# Patient Record
Sex: MALE | Race: WHITE | NOT HISPANIC OR LATINO | Employment: STUDENT | URBAN - NONMETROPOLITAN AREA
[De-identification: names, ages, dates, MRNs, and addresses within clinical notes are randomized per-mention and may not be internally consistent; named-entity substitution may affect disease eponyms.]

---
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
## 2021-02-12 ENCOUNTER — OFFICE VISIT (OUTPATIENT)
Dept: FAMILY MEDICINE | Facility: OTHER | Age: 21
End: 2021-02-12
Attending: FAMILY MEDICINE
Payer: COMMERCIAL

## 2021-02-12 VITALS
DIASTOLIC BLOOD PRESSURE: 100 MMHG | OXYGEN SATURATION: 98 % | BODY MASS INDEX: 31.24 KG/M2 | TEMPERATURE: 98.9 F | WEIGHT: 223.13 LBS | HEART RATE: 116 BPM | SYSTOLIC BLOOD PRESSURE: 162 MMHG | HEIGHT: 71 IN | RESPIRATION RATE: 20 BRPM

## 2021-02-12 DIAGNOSIS — Z83.49 FAMILY HISTORY OF THYROID DISEASE: ICD-10-CM

## 2021-02-12 DIAGNOSIS — I10 HYPERTENSION, UNSPECIFIED TYPE: Primary | ICD-10-CM

## 2021-02-12 LAB
ALBUMIN UR-MCNC: NEGATIVE MG/DL
ANION GAP SERPL CALCULATED.3IONS-SCNC: 6 MMOL/L (ref 3–14)
APPEARANCE UR: CLEAR
BILIRUB UR QL STRIP: NEGATIVE
BUN SERPL-MCNC: 15 MG/DL (ref 7–25)
CALCIUM SERPL-MCNC: 10 MG/DL (ref 8.6–10.3)
CHLORIDE SERPL-SCNC: 103 MMOL/L (ref 98–107)
CHOLEST SERPL-MCNC: 135 MG/DL
CO2 SERPL-SCNC: 30 MMOL/L (ref 21–31)
COLOR UR AUTO: NORMAL
CREAT SERPL-MCNC: 1 MG/DL (ref 0.7–1.3)
ERYTHROCYTE [DISTWIDTH] IN BLOOD BY AUTOMATED COUNT: 12.2 % (ref 10–15)
GFR SERPL CREATININE-BSD FRML MDRD: >90 ML/MIN/{1.73_M2}
GLUCOSE SERPL-MCNC: 116 MG/DL (ref 70–105)
GLUCOSE UR STRIP-MCNC: NEGATIVE MG/DL
HCT VFR BLD AUTO: 49.2 % (ref 40–53)
HDLC SERPL-MCNC: 35 MG/DL (ref 23–92)
HGB BLD-MCNC: 16.9 G/DL (ref 13.3–17.7)
HGB UR QL STRIP: NEGATIVE
KETONES UR STRIP-MCNC: NEGATIVE MG/DL
LDLC SERPL CALC-MCNC: 80 MG/DL
LEUKOCYTE ESTERASE UR QL STRIP: NEGATIVE
MCH RBC QN AUTO: 28.8 PG (ref 26.5–33)
MCHC RBC AUTO-ENTMCNC: 34.3 G/DL (ref 31.5–36.5)
MCV RBC AUTO: 84 FL (ref 78–100)
NITRATE UR QL: NEGATIVE
NONHDLC SERPL-MCNC: 100 MG/DL
PH UR STRIP: 7 PH (ref 5–7)
PLATELET # BLD AUTO: 285 10E9/L (ref 150–450)
POTASSIUM SERPL-SCNC: 3.7 MMOL/L (ref 3.5–5.1)
RBC # BLD AUTO: 5.87 10E12/L (ref 4.4–5.9)
SODIUM SERPL-SCNC: 139 MMOL/L (ref 134–144)
SOURCE: NORMAL
SP GR UR STRIP: 1.02 (ref 1–1.03)
TRIGL SERPL-MCNC: 99 MG/DL
TSH SERPL DL<=0.05 MIU/L-ACNC: 1.84 IU/ML (ref 0.34–5.6)
UROBILINOGEN UR STRIP-MCNC: NORMAL MG/DL (ref 0–2)
WBC # BLD AUTO: 6 10E9/L (ref 4–11)

## 2021-02-12 PROCEDURE — 85027 COMPLETE CBC AUTOMATED: CPT | Mod: ZL | Performed by: FAMILY MEDICINE

## 2021-02-12 PROCEDURE — 36415 COLL VENOUS BLD VENIPUNCTURE: CPT | Mod: ZL | Performed by: FAMILY MEDICINE

## 2021-02-12 PROCEDURE — 80048 BASIC METABOLIC PNL TOTAL CA: CPT | Mod: ZL | Performed by: FAMILY MEDICINE

## 2021-02-12 PROCEDURE — 93000 ELECTROCARDIOGRAM COMPLETE: CPT | Performed by: INTERNAL MEDICINE

## 2021-02-12 PROCEDURE — 84443 ASSAY THYROID STIM HORMONE: CPT | Mod: ZL | Performed by: FAMILY MEDICINE

## 2021-02-12 PROCEDURE — 81003 URINALYSIS AUTO W/O SCOPE: CPT | Mod: ZL | Performed by: FAMILY MEDICINE

## 2021-02-12 PROCEDURE — 80061 LIPID PANEL: CPT | Mod: ZL | Performed by: FAMILY MEDICINE

## 2021-02-12 PROCEDURE — 99214 OFFICE O/P EST MOD 30 MIN: CPT | Performed by: FAMILY MEDICINE

## 2021-02-12 RX ORDER — METOPROLOL SUCCINATE 25 MG/1
25 TABLET, EXTENDED RELEASE ORAL DAILY
Qty: 30 TABLET | Refills: 1 | Status: SHIPPED | OUTPATIENT
Start: 2021-02-12 | End: 2021-02-24

## 2021-02-12 ASSESSMENT — PAIN SCALES - GENERAL: PAINLEVEL: NO PAIN (0)

## 2021-02-12 ASSESSMENT — MIFFLIN-ST. JEOR: SCORE: 2049.22

## 2021-02-12 NOTE — PATIENT INSTRUCTIONS
Checking labs for causes of hypertension   Start Toprol 25 mg daily to lower blood pressure and slow pulse  If you have no side effects and blood pressure is over 150/90 next week then take 50 mg (2 pills) until follow up appointment  Follow-up in 2 weeks

## 2021-02-12 NOTE — NURSING NOTE
"Patient presents to the clinic for elevated blood pressure readings over the past 5 months.  Today 190/120 with chest pressure and increase in shortness of breath.  Patient reports having a history of anxiety, prefers questionnaires sent to [facility].  Family history of cardiac problems.  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      Chief Complaint   Patient presents with     Hypertension       Initial BP (!) 162/100 (BP Location: Right arm, Patient Position: Sitting, Cuff Size: Adult Regular)   Pulse 116   Temp 98.9  F (37.2  C) (Temporal)   Resp 20   Ht 1.803 m (5' 11\")   Wt 101.2 kg (223 lb 2 oz)   SpO2 98%   BMI 31.12 kg/m   Estimated body mass index is 31.12 kg/m  as calculated from the following:    Height as of this encounter: 1.803 m (5' 11\").    Weight as of this encounter: 101.2 kg (223 lb 2 oz).  Medication Reconciliation: complete    Jocelyne Higuera LPN    "

## 2021-02-12 NOTE — PROGRESS NOTES
"Nursing Notes:   Jocelyne Higuera LPN  2/12/2021  4:08 PM  Signed  Patient presents to the clinic for elevated blood pressure readings over the past 5 months.  Today 190/120 with chest pressure and increase in shortness of breath.  Patient reports having a history of anxiety, prefers questionnaires sent to [facility].  Family history of cardiac problems.  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      Chief Complaint   Patient presents with     Hypertension       Initial BP (!) 162/100 (BP Location: Right arm, Patient Position: Sitting, Cuff Size: Adult Regular)   Pulse 116   Temp 98.9  F (37.2  C) (Temporal)   Resp 20   Ht 1.803 m (5' 11\")   Wt 101.2 kg (223 lb 2 oz)   SpO2 98%   BMI 31.12 kg/m   Estimated body mass index is 31.12 kg/m  as calculated from the following:    Height as of this encounter: 1.803 m (5' 11\").    Weight as of this encounter: 101.2 kg (223 lb 2 oz).  Medication Reconciliation: complete    Jocelyne Higuera LPN         Assessment & Plan       ICD-10-CM    1. Hypertension, unspecified type  I10 EKG 12-lead, tracing only     Basic Metabolic Panel     CBC W PLT No Diff     UA reflex to Microscopic     Lipid Panel     metoprolol succinate ER (TOPROL-XL) 25 MG 24 hr tablet     Lipid Panel     CBC W PLT No Diff     Basic Metabolic Panel     UA reflex to Microscopic   2. Family history of thyroid disease  Z83.49 TSH Reflex GH     TSH Reflex GH       Blood pressure elevated on more than one occasion, giving official hypertension diagnosis.  Today with concerning associated symptoms. Typically with current blood pressure would not have hypertensive urgency, but certainly with systolic of 190 earlier that could be seen  EKG obtained and shows no concerning ST changes. Unlikely to have cardiac ischemia at his age.  We discussed that certainly having high blood pressure could cause some of his symptoms such as headache, chest discomfort.  Anxiety could raise blood pressure, although " he feels that is not the case.  TSH obtained given family history and associated symptoms, but returns normal.  Lab work-up for hypertension obtained and is unrevealing.    We discussed options for treatment of hypertension.  Currently with anxiety symptoms and labs had not yet resulted, it would be best to pick medication that does not affect the labs.  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    Follow up in 2 weeks        Bandar Colon MD   [facility]      SUBJECTIVE:  20 year old male presents for elevated blood pressure.    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  Today his blood pressure was 190/120 with chest pressure and increase in shortness of breath, so nursing instructor recommended he come in for evaluation.  Constant headache recently.   Shortness of breath and chest heaviness present currently.  He reports some mood changes due to COVID and starting nursing program, but does not think anxiety has raised blood pressure  Eating more packaged foods now than in the past  Weight increased    Family History   Problem Relation Age of Onset     Other - See Comments Maternal Grandmother         pulmonary fibrosis     Other - See Comments Mother         graves disease     Hypertension Father      Heart Disease Maternal Grandfather 65     Hypertension Maternal Grandfather      Diabetes Maternal Grandfather      Diabetes Other         cousins     Hypertension Paternal Grandfather        REVIEW OF SYSTEMS:    Pertinent items are noted in HPI.    No current outpatient medications on file.     Allergies   Allergen Reactions     Amoxicillin-Pot Clavulanate Hives     Cefprozil Hives     Cefzil       OBJECTIVE:  BP (!) 162/100 (BP Location: Right arm,  "Patient Position: Sitting, Cuff Size: Adult Regular)   Pulse 116   Temp 98.9  F (37.2  C) (Temporal)   Resp 20   Ht 1.803 m (5' 11\")   Wt 101.2 kg (223 lb 2 oz)   SpO2 98%   BMI 31.12 kg/m      EXAM:  General Appearance: Alert. No acute distress  Chest/Respiratory Exam: Clear to auscultation bilaterally  Cardiovascular Exam: Regular rate and rhythm. S1, S2, no murmur, gallop, or rubs.  Extremities: 2+ pedal pulses.  No lower extremity edema.  Neurological: Slight, high-frequency tremor with arms extended, consistent more with anxiety  Psychiatric: Normal affect and mentation      Results for orders placed or performed in visit on 02/12/21   Lipid Panel     Status: None   Result Value Ref Range    Cholesterol 135 <200 mg/dL    Triglycerides 99 <150 mg/dL    HDL Cholesterol 35 23 - 92 mg/dL    LDL Cholesterol Calculated 80 <110 mg/dL    Non HDL Cholesterol 100 <120 mg/dL   TSH Reflex GH     Status: None   Result Value Ref Range    TSH Reflex 1.84 0.34 - 5.60 IU/mL   CBC W PLT No Diff     Status: None   Result Value Ref Range    WBC 6.0 4.0 - 11.0 10e9/L    RBC Count 5.87 4.4 - 5.9 10e12/L    Hemoglobin 16.9 13.3 - 17.7 g/dL    Hematocrit 49.2 40.0 - 53.0 %    MCV 84 78 - 100 fl    MCH 28.8 26.5 - 33.0 pg    MCHC 34.3 31.5 - 36.5 g/dL    RDW 12.2 10.0 - 15.0 %    Platelet Count 285 150 - 450 10e9/L   Basic Metabolic Panel     Status: Abnormal   Result Value Ref Range    Sodium 139 134 - 144 mmol/L    Potassium 3.7 3.5 - 5.1 mmol/L    Chloride 103 98 - 107 mmol/L    Carbon Dioxide 30 21 - 31 mmol/L    Anion Gap 6 3 - 14 mmol/L    Glucose 116 (H) 70 - 105 mg/dL    Urea Nitrogen 15 7 - 25 mg/dL    Creatinine 1.00 0.70 - 1.30 mg/dL    GFR Estimate >90 >60 mL/min/[1.73_m2]    GFR Estimate If Black >90 >60 mL/min/[1.73_m2]    Calcium 10.0 8.6 - 10.3 mg/dL   UA reflex to Microscopic     Status: None   Result Value Ref Range    Color Urine Light Yellow     Appearance Urine Clear     Glucose Urine Negative NEG^Negative " mg/dL    Bilirubin Urine Negative NEG^Negative    Ketones Urine Negative NEG^Negative mg/dL    Specific Gravity Urine 1.020 1.003 - 1.035    Blood Urine Negative NEG^Negative    pH Urine 7.0 5.0 - 7.0 pH    Protein Albumin Urine Negative NEG^Negative mg/dL    Urobilinogen mg/dL Normal 0.0 - 2.0 mg/dL    Nitrite Urine Negative NEG^Negative    Leukocyte Esterase Urine Negative NEG^Negative    Source Midstream Urine    EKG 12-lead, tracing only     Status: None   Result Value Ref Range    Interpretation ECG Click View Image link to view waveform and result           This document was prepared using a combination of typing and voice generated software.  While every attempt was made for accuracy, spelling and grammatical errors may exist.

## 2021-02-13 LAB — INTERPRETATION ECG - MUSE: NORMAL

## 2021-02-24 ENCOUNTER — OFFICE VISIT (OUTPATIENT)
Dept: FAMILY MEDICINE | Facility: OTHER | Age: 21
End: 2021-02-24
Attending: FAMILY MEDICINE
Payer: COMMERCIAL

## 2021-02-24 VITALS
WEIGHT: 224.13 LBS | TEMPERATURE: 97.5 F | DIASTOLIC BLOOD PRESSURE: 98 MMHG | OXYGEN SATURATION: 97 % | BODY MASS INDEX: 31.26 KG/M2 | HEART RATE: 96 BPM | SYSTOLIC BLOOD PRESSURE: 144 MMHG | RESPIRATION RATE: 16 BRPM

## 2021-02-24 DIAGNOSIS — I10 ESSENTIAL HYPERTENSION: ICD-10-CM

## 2021-02-24 PROCEDURE — 99213 OFFICE O/P EST LOW 20 MIN: CPT | Performed by: FAMILY MEDICINE

## 2021-02-24 RX ORDER — LISINOPRIL 10 MG/1
10 TABLET ORAL DAILY
Qty: 30 TABLET | Refills: 1 | Status: SHIPPED | OUTPATIENT
Start: 2021-02-24 | End: 2021-03-17

## 2021-02-24 RX ORDER — METOPROLOL SUCCINATE 25 MG/1
25 TABLET, EXTENDED RELEASE ORAL DAILY
Qty: 30 TABLET | Refills: 1 | Status: SHIPPED | OUTPATIENT
Start: 2021-02-24 | End: 2021-03-17

## 2021-02-24 ASSESSMENT — ANXIETY QUESTIONNAIRES
7. FEELING AFRAID AS IF SOMETHING AWFUL MIGHT HAPPEN: NOT AT ALL
3. WORRYING TOO MUCH ABOUT DIFFERENT THINGS: NOT AT ALL
GAD7 TOTAL SCORE: 2
IF YOU CHECKED OFF ANY PROBLEMS ON THIS QUESTIONNAIRE, HOW DIFFICULT HAVE THESE PROBLEMS MADE IT FOR YOU TO DO YOUR WORK, TAKE CARE OF THINGS AT HOME, OR GET ALONG WITH OTHER PEOPLE: NOT DIFFICULT AT ALL
6. BECOMING EASILY ANNOYED OR IRRITABLE: NOT AT ALL
1. FEELING NERVOUS, ANXIOUS, OR ON EDGE: SEVERAL DAYS
2. NOT BEING ABLE TO STOP OR CONTROL WORRYING: NOT AT ALL
5. BEING SO RESTLESS THAT IT IS HARD TO SIT STILL: NOT AT ALL

## 2021-02-24 ASSESSMENT — PATIENT HEALTH QUESTIONNAIRE - PHQ9
5. POOR APPETITE OR OVEREATING: SEVERAL DAYS
SUM OF ALL RESPONSES TO PHQ QUESTIONS 1-9: 6

## 2021-02-24 ASSESSMENT — PAIN SCALES - GENERAL: PAINLEVEL: NO PAIN (0)

## 2021-02-24 NOTE — NURSING NOTE
"Patient presents to the clinic for follow up with hypertension.      Chief Complaint   Patient presents with     Clinic Care Coordination - Follow-up       Initial BP (!) 156/100 (BP Location: Right arm, Patient Position: Sitting, Cuff Size: Adult Regular)   Pulse 96   Temp 97.5  F (36.4  C) (Temporal)   Resp 16   Wt 101.7 kg (224 lb 2 oz)   SpO2 97%   BMI 31.26 kg/m   Estimated body mass index is 31.26 kg/m  as calculated from the following:    Height as of 2/12/21: 1.803 m (5' 11\").    Weight as of this encounter: 101.7 kg (224 lb 2 oz).  Medication Reconciliation: complete    Jocelyne Higuera LPN    "

## 2021-02-24 NOTE — PROGRESS NOTES
"Nursing Notes:   Jocelyne Higuera LPN  2/24/2021  4:28 PM  Signed  Patient presents to the clinic for follow up with hypertension.      Chief Complaint   Patient presents with     Clinic Care Coordination - Follow-up       Initial BP (!) 156/100 (BP Location: Right arm, Patient Position: Sitting, Cuff Size: Adult Regular)   Pulse 96   Temp 97.5  F (36.4  C) (Temporal)   Resp 16   Wt 101.7 kg (224 lb 2 oz)   SpO2 97%   BMI 31.26 kg/m   Estimated body mass index is 31.26 kg/m  as calculated from the following:    Height as of 2/12/21: 1.803 m (5' 11\").    Weight as of this encounter: 101.7 kg (224 lb 2 oz).  Medication Reconciliation: complete    Jocelyne Higuera LPN         Assessment & Plan       ICD-10-CM    1. Essential hypertension  I10 metoprolol succinate ER (TOPROL-XL) 25 MG 24 hr tablet     lisinopril (ZESTRIL) 10 MG tablet     Blood pressure improved, but remains above goal.  Labs are all fine, so seems more like genetic essential hypertension rather than secondary hypertension.  Metoprolol caused slight fatigue, which is not unexpected.  Is not an ideal solitary long-term treatment for blood pressure.  Was started for potential hyperthyroidism based on symptoms from last appointment, but TSH is normal.    Recommend starting lisinopril 10 mg daily.  Depending on results, could transition off metoprolol and onto the higher lisinopril dose.      Follow up 3 weeks    Bandar Colon MD     [facility]      SUBJECTIVE:  20 year old male presents for follow up on hypertension  Metoprolol started 2 weeks ago at 25 mg daily  Felt a little tired on it at first, but better now  Readings were better under 150/90, compared to over 160/100 previously      REVIEW OF SYSTEMS:    Pertinent items are noted in HPI.    Current Outpatient Medications   Medication Sig Dispense Refill     metoprolol succinate ER (TOPROL-XL) 25 MG 24 hr tablet Take 1 tablet (25 mg) by mouth daily 30 tablet 1 "     Allergies   Allergen Reactions     Amoxicillin-Pot Clavulanate Hives     Cefprozil Hives     Cefzil       OBJECTIVE:  BP (!) 156/100 (BP Location: Right arm, Patient Position: Sitting, Cuff Size: Adult Regular)   Pulse 96   Temp 97.5  F (36.4  C) (Temporal)   Resp 16   Wt 101.7 kg (224 lb 2 oz)   SpO2 97%   BMI 31.26 kg/m      EXAM:  General Appearance: Alert. No acute distress  Psychiatric: Normal affect and mentation        This document was prepared using a combination of typing and voice generated software.  While every attempt was made for accuracy, spelling and grammatical errors may exist.

## 2021-02-24 NOTE — PATIENT INSTRUCTIONS
Start lisinopril 10 mg daily  Continue metoprolol for now  If readings are often below 120/70 or you feel lightheaded, then stop the metoprolol  Follow-up in 3 weeks

## 2021-02-25 ASSESSMENT — ANXIETY QUESTIONNAIRES: GAD7 TOTAL SCORE: 2

## 2021-03-17 ENCOUNTER — OFFICE VISIT (OUTPATIENT)
Dept: FAMILY MEDICINE | Facility: OTHER | Age: 21
End: 2021-03-17
Attending: FAMILY MEDICINE
Payer: COMMERCIAL

## 2021-03-17 VITALS
TEMPERATURE: 97.1 F | OXYGEN SATURATION: 98 % | BODY MASS INDEX: 31.5 KG/M2 | HEIGHT: 71 IN | RESPIRATION RATE: 20 BRPM | DIASTOLIC BLOOD PRESSURE: 80 MMHG | WEIGHT: 225 LBS | HEART RATE: 98 BPM | SYSTOLIC BLOOD PRESSURE: 122 MMHG

## 2021-03-17 DIAGNOSIS — I10 ESSENTIAL HYPERTENSION: ICD-10-CM

## 2021-03-17 PROCEDURE — 99213 OFFICE O/P EST LOW 20 MIN: CPT | Performed by: FAMILY MEDICINE

## 2021-03-17 RX ORDER — LISINOPRIL 10 MG/1
10 TABLET ORAL DAILY
Qty: 90 TABLET | Refills: 4 | Status: SHIPPED | OUTPATIENT
Start: 2021-03-17 | End: 2022-02-28 | Stop reason: SINTOL

## 2021-03-17 ASSESSMENT — ANXIETY QUESTIONNAIRES
6. BECOMING EASILY ANNOYED OR IRRITABLE: SEVERAL DAYS
3. WORRYING TOO MUCH ABOUT DIFFERENT THINGS: SEVERAL DAYS
GAD7 TOTAL SCORE: 3
1. FEELING NERVOUS, ANXIOUS, OR ON EDGE: SEVERAL DAYS
5. BEING SO RESTLESS THAT IT IS HARD TO SIT STILL: NOT AT ALL
7. FEELING AFRAID AS IF SOMETHING AWFUL MIGHT HAPPEN: NOT AT ALL
IF YOU CHECKED OFF ANY PROBLEMS ON THIS QUESTIONNAIRE, HOW DIFFICULT HAVE THESE PROBLEMS MADE IT FOR YOU TO DO YOUR WORK, TAKE CARE OF THINGS AT HOME, OR GET ALONG WITH OTHER PEOPLE: NOT DIFFICULT AT ALL
2. NOT BEING ABLE TO STOP OR CONTROL WORRYING: NOT AT ALL

## 2021-03-17 ASSESSMENT — PATIENT HEALTH QUESTIONNAIRE - PHQ9
5. POOR APPETITE OR OVEREATING: NOT AT ALL
SUM OF ALL RESPONSES TO PHQ QUESTIONS 1-9: 4

## 2021-03-17 ASSESSMENT — MIFFLIN-ST. JEOR: SCORE: 2057.72

## 2021-03-17 ASSESSMENT — PAIN SCALES - GENERAL: PAINLEVEL: NO PAIN (0)

## 2021-03-17 NOTE — PROGRESS NOTES
"Nursing Notes:   Ju Santiago LPN  3/17/2021  4:37 PM  Signed  Chief Complaint   Patient presents with     RECHECK     blood pressure   Patient is here for a blood pressure follow up .   Initial /80 (BP Location: Right arm, Patient Position: Sitting, Cuff Size: Adult Large)   Pulse 98   Temp 97.1  F (36.2  C) (Temporal)   Resp 20   Ht 1.803 m (5' 11\")   Wt 102.1 kg (225 lb)   SpO2 98%   BMI 31.38 kg/m   Estimated body mass index is 31.38 kg/m  as calculated from the following:    Height as of this encounter: 1.803 m (5' 11\").    Weight as of this encounter: 102.1 kg (225 lb).  Medication Reconciliation: complete    Ju Santiago LPN       Assessment & Plan       ICD-10-CM    1. Essential hypertension  I10 lisinopril (ZESTRIL) 10 MG tablet     Blood pressure very well controlled on 10 mg lisinopril. Better than expected response to this dose  He can try reducing to 5 mg and tracking readings.   If 5 mg effective call for lower dose on future refill  Planned lab today expecting need for more lisinopril. Elected against lab given low dose lisinopril required.      BMI:   Estimated body mass index is 31.38 kg/m  as calculated from the following:    Height as of this encounter: 1.803 m (5' 11\").    Weight as of this encounter: 102.1 kg (225 lb).   Weight management plan: Discussed healthy diet and exercise guidelines      Follow up 1 year    Bandar Colon MD     [facility]      SUBJECTIVE:  20 year old male presents for follow up on hypertension     Changed from metoprolol to lisinopril 10 mg daily  Helping without side effects  Lowest is 113/68, highest 130/80. Usually below 120/80  Completely off metoprolol    REVIEW OF SYSTEMS:    Constitutional: negative  Respiratory: negative  Cardiovascular: negative    Current Outpatient Medications   Medication Sig Dispense Refill     lisinopril (ZESTRIL) 10 MG tablet Take 1 tablet (10 mg) by mouth daily 30 tablet 1     metoprolol " "succinate ER (TOPROL-XL) 25 MG 24 hr tablet Take 1 tablet (25 mg) by mouth daily 30 tablet 1     Allergies   Allergen Reactions     Amoxicillin-Pot Clavulanate Hives     Cefprozil Hives     Cefzil       OBJECTIVE:  /80 (BP Location: Right arm, Patient Position: Sitting, Cuff Size: Adult Large)   Pulse 98   Temp 97.1  F (36.2  C) (Temporal)   Resp 20   Ht 1.803 m (5' 11\")   Wt 102.1 kg (225 lb)   SpO2 98%   BMI 31.38 kg/m      EXAM:  General Appearance: Alert. No acute distress  Psychiatric: Normal affect and mentation        This document was prepared using a combination of typing and voice generated software.  While every attempt was made for accuracy, spelling and grammatical errors may exist.   "

## 2021-03-17 NOTE — NURSING NOTE
"Chief Complaint   Patient presents with     RECHECK     blood pressure   Patient is here for a blood pressure follow up .   Initial /80 (BP Location: Right arm, Patient Position: Sitting, Cuff Size: Adult Large)   Pulse 98   Temp 97.1  F (36.2  C) (Temporal)   Resp 20   Ht 1.803 m (5' 11\")   Wt 102.1 kg (225 lb)   SpO2 98%   BMI 31.38 kg/m   Estimated body mass index is 31.38 kg/m  as calculated from the following:    Height as of this encounter: 1.803 m (5' 11\").    Weight as of this encounter: 102.1 kg (225 lb).  Medication Reconciliation: complete    Ju Santiago LPN  "

## 2021-03-18 ASSESSMENT — ANXIETY QUESTIONNAIRES: GAD7 TOTAL SCORE: 3

## 2021-09-15 ENCOUNTER — ALLIED HEALTH/NURSE VISIT (OUTPATIENT)
Dept: FAMILY MEDICINE | Facility: OTHER | Age: 21
End: 2021-09-15
Attending: FAMILY MEDICINE
Payer: COMMERCIAL

## 2021-09-15 DIAGNOSIS — R51.9 NONINTRACTABLE EPISODIC HEADACHE, UNSPECIFIED HEADACHE TYPE: ICD-10-CM

## 2021-09-15 DIAGNOSIS — Z20.822 EXPOSURE TO 2019 NOVEL CORONAVIRUS: Primary | ICD-10-CM

## 2021-09-15 PROCEDURE — C9803 HOPD COVID-19 SPEC COLLECT: HCPCS

## 2021-09-15 PROCEDURE — U0005 INFEC AGEN DETEC AMPLI PROBE: HCPCS | Mod: ZL

## 2021-09-15 NOTE — NURSING NOTE
Chief Complaint   Patient presents with     Covid 19 Testing     Patient swabbed for COVID-19 testing for headache    Nhung Butler MA on 9/15/2021 at 10:35 AM

## 2021-09-16 LAB — SARS-COV-2 RNA RESP QL NAA+PROBE: NEGATIVE

## 2021-10-03 ENCOUNTER — HEALTH MAINTENANCE LETTER (OUTPATIENT)
Age: 21
End: 2021-10-03

## 2022-01-22 ENCOUNTER — HEALTH MAINTENANCE LETTER (OUTPATIENT)
Age: 22
End: 2022-01-22

## 2022-02-28 ENCOUNTER — OFFICE VISIT (OUTPATIENT)
Dept: FAMILY MEDICINE | Facility: OTHER | Age: 22
End: 2022-02-28
Attending: FAMILY MEDICINE
Payer: COMMERCIAL

## 2022-02-28 VITALS
OXYGEN SATURATION: 98 % | WEIGHT: 225 LBS | BODY MASS INDEX: 31.38 KG/M2 | HEART RATE: 94 BPM | DIASTOLIC BLOOD PRESSURE: 98 MMHG | TEMPERATURE: 98.6 F | SYSTOLIC BLOOD PRESSURE: 160 MMHG | RESPIRATION RATE: 16 BRPM

## 2022-02-28 DIAGNOSIS — I10 ESSENTIAL HYPERTENSION: ICD-10-CM

## 2022-02-28 DIAGNOSIS — F41.1 GENERALIZED ANXIETY DISORDER: ICD-10-CM

## 2022-02-28 DIAGNOSIS — Z00.00 PREVENTATIVE HEALTH CARE: Primary | ICD-10-CM

## 2022-02-28 DIAGNOSIS — R01.1 SYSTOLIC MURMUR: ICD-10-CM

## 2022-02-28 DIAGNOSIS — E04.9 THYROID ENLARGEMENT: ICD-10-CM

## 2022-02-28 LAB
ANION GAP SERPL CALCULATED.3IONS-SCNC: 7 MMOL/L (ref 3–14)
BUN SERPL-MCNC: 15 MG/DL (ref 7–25)
CALCIUM SERPL-MCNC: 10.1 MG/DL (ref 8.6–10.3)
CHLORIDE BLD-SCNC: 103 MMOL/L (ref 98–107)
CO2 SERPL-SCNC: 27 MMOL/L (ref 21–31)
CREAT SERPL-MCNC: 0.99 MG/DL (ref 0.7–1.3)
GFR SERPL CREATININE-BSD FRML MDRD: >90 ML/MIN/1.73M2
GLUCOSE BLD-MCNC: 97 MG/DL (ref 70–105)
POTASSIUM BLD-SCNC: 4.1 MMOL/L (ref 3.5–5.1)
SODIUM SERPL-SCNC: 137 MMOL/L (ref 134–144)
TSH SERPL DL<=0.005 MIU/L-ACNC: 0.65 MU/L (ref 0.4–4)

## 2022-02-28 PROCEDURE — 99213 OFFICE O/P EST LOW 20 MIN: CPT | Mod: 25 | Performed by: FAMILY MEDICINE

## 2022-02-28 PROCEDURE — 99395 PREV VISIT EST AGE 18-39: CPT | Performed by: FAMILY MEDICINE

## 2022-02-28 PROCEDURE — 82310 ASSAY OF CALCIUM: CPT | Mod: ZL | Performed by: FAMILY MEDICINE

## 2022-02-28 PROCEDURE — 36415 COLL VENOUS BLD VENIPUNCTURE: CPT | Mod: ZL | Performed by: FAMILY MEDICINE

## 2022-02-28 PROCEDURE — 84443 ASSAY THYROID STIM HORMONE: CPT | Mod: ZL | Performed by: FAMILY MEDICINE

## 2022-02-28 RX ORDER — AMLODIPINE BESYLATE 5 MG/1
5 TABLET ORAL DAILY
Qty: 30 TABLET | Refills: 1 | Status: SHIPPED | OUTPATIENT
Start: 2022-02-28 | End: 2022-03-29

## 2022-02-28 ASSESSMENT — ANXIETY QUESTIONNAIRES
5. BEING SO RESTLESS THAT IT IS HARD TO SIT STILL: SEVERAL DAYS
4. TROUBLE RELAXING: MORE THAN HALF THE DAYS
7. FEELING AFRAID AS IF SOMETHING AWFUL MIGHT HAPPEN: NOT AT ALL
GAD7 TOTAL SCORE: 10
7. FEELING AFRAID AS IF SOMETHING AWFUL MIGHT HAPPEN: NOT AT ALL
2. NOT BEING ABLE TO STOP OR CONTROL WORRYING: SEVERAL DAYS
6. BECOMING EASILY ANNOYED OR IRRITABLE: NEARLY EVERY DAY
1. FEELING NERVOUS, ANXIOUS, OR ON EDGE: MORE THAN HALF THE DAYS
GAD7 TOTAL SCORE: 10
3. WORRYING TOO MUCH ABOUT DIFFERENT THINGS: SEVERAL DAYS

## 2022-02-28 ASSESSMENT — ENCOUNTER SYMPTOMS: NERVOUS/ANXIOUS: 1

## 2022-02-28 ASSESSMENT — PAIN SCALES - GENERAL: PAINLEVEL: NO PAIN (0)

## 2022-02-28 NOTE — PATIENT INSTRUCTIONS
Stop lisinopril  Start amlodipine 5 mg daily    Start fluoxetine 20 mg daily to help anxiety    Checking thyroid and kidney tests  Thyroid ultrasound     Echocardiogram of your heart due to very quiet murmur

## 2022-02-28 NOTE — NURSING NOTE
"Chief Complaint   Patient presents with     Physical     Anxiety     Hypertension     Patient presents to clinic for physical. Has concurs about blood pressure, and anxiety.      Initial BP (!) 152/102 (BP Location: Right arm, Patient Position: Sitting, Cuff Size: Adult Regular)   Pulse 94   Temp 98.6  F (37  C) (Tympanic)   Resp 16   Wt 102.1 kg (225 lb)   SpO2 98%   BMI 31.38 kg/m   Estimated body mass index is 31.38 kg/m  as calculated from the following:    Height as of 3/17/21: 1.803 m (5' 11\").    Weight as of this encounter: 102.1 kg (225 lb).  Medication Reconciliation: complete  Libra Giang RN  FOOD SECURITY SCREENING QUESTIONS  The next two questions are to help us understand your food security.  If you are feeling you need any assistance in this area, we have resources available to support you today.    Hunger Vital Signs:  Within the past 12 months we worried whether our food would run out before we got money to buy more. Never  Within the past 12 months the food we bought just didn't last and we didn't have money to get more. Never      Libra Giang RN 2/28/2022 11:30 AM  "

## 2022-02-28 NOTE — PROGRESS NOTES
"  Assessment & Plan       ICD-10-CM    1. Preventative health care  Z00.00    2. Systolic murmur  R01.1 Echocardiogram Complete   3. Thyroid enlargement  E04.9 TSH Reflex GH     US Thyroid     TSH Reflex GH   4. Essential hypertension  I10 amLODIPine (NORVASC) 5 MG tablet     Basic Metabolic Panel     Basic Metabolic Panel   5. Generalized anxiety disorder  F41.1 FLUoxetine (PROZAC) 20 MG capsule       Blood pressure was controlled on lisinopril, but is now uncontrolled.  He attributes this to anxiety.  Additionally, having some side effects from lisinopril with chronic slight cough.    Reviewed other options for hypertension treatment.  Start amlodipine 5 mg daily.  He will monitor blood pressure at home.    Interested in treatment for generalized anxiety.  Reviewed some medication options.  Start fluoxetine 20 mg daily.  Most side effects should improve in the first several days.  If not, let me know.    On exam, has a slight systolic murmur.  Recommend echocardiogram for further assessment.    Thyroid is enlarged.  Mother has a history of Graves'.  Certainly this could contribute to hypertension and anxiety.  TSH normal.  Ordered thyroid ultrasound for further assessment.    BMI:   Estimated body mass index is 31.38 kg/m  as calculated from the following:    Height as of 3/17/21: 1.803 m (5' 11\").    Weight as of this encounter: 102.1 kg (225 lb).   Weight management plan: Discussed healthy diet and exercise guidelines    Vaccinations are current.  Declines HIV and hep C testing      Return today (on 2/28/2022).    Bandar Colon MD  [facility] is a 21 year old who presents for the following health issues physical, anxiety and blood pressure.       Healthy Habits:     Getting at least 3 servings of Calcium per day:  NO    Bi-annual eye exam:  Yes    Dental care twice a year:  Yes    Sleep apnea or symptoms of sleep apnea:  Daytime drowsiness    Diet:  Regular (no " restrictions)    Frequency of exercise:  2-3 days/week    Duration of exercise:  15-30 minutes    Taking medications regularly:  Yes    Medication side effects:  None    PHQ-2 Total Score: 0    Additional concerns today:  Yes  Anxiety       Blood pressure was controlled on lisinopril  More stress since August  Blood pressure higher since  He believes anxiety is the cause  Has not taken antianxiety medicine in the past.    Review of Systems   Constitutional: Negative.    HENT: Negative.    Eyes: Negative.    Respiratory: Negative.    Cardiovascular: Negative.    Gastrointestinal: Negative.    Genitourinary: Negative.    Musculoskeletal: Negative.    Neurological: Negative.    Psychiatric/Behavioral: The patient is nervous/anxious.             Objective    BP (!) 152/102 (BP Location: Right arm, Patient Position: Sitting, Cuff Size: Adult Regular)   Pulse 94   Temp 98.6  F (37  C) (Tympanic)   Resp 16   Wt 102.1 kg (225 lb)   SpO2 98%   BMI 31.38 kg/m    Body mass index is 31.38 kg/m .  Physical Exam   General Appearance: Pleasant, alert, appropriate appearance for age. No acute distress  Eye Exam:  Normal external eye, conjunctiva, lids, cornea. INDIO.  Ear Exam: Normal TM's bilaterally. Normal auditory canals and external ears.  OroPharynx Exam:  Dental hygiene adequate. Normal buccal mucosa. Normal pharynx.  Neck Exam:  Supple, no masses or nodes. No carotid bruits.  Thyroid Exam: Right thyroid is enlarged  Chest/Respiratory Exam: Normal chest wall and respirations. Clear to auscultation.  Cardiovascular Exam: Regular rate and rhythm. 2/6 LAURA and split S2  Gastrointestinal Exam: Soft, non-tender, no masses or organomegaly.  Musculoskeletal Exam: Back is straight and non-tender, full ROM of upper and lower extremities.  Foot Exam: Left and right foot: good pedal pulses.  Skin: no rash or abnormalities  Neurologic Exam: Nonfocal, symmetric DTRs, normal gross motor, tone coordination and no tremor.  Psychiatric  Exam: Alert and oriented - appropriate affect.      Results for orders placed or performed in visit on 02/28/22   Basic Metabolic Panel     Status: Normal   Result Value Ref Range    Sodium 137 134 - 144 mmol/L    Potassium 4.1 3.5 - 5.1 mmol/L    Chloride 103 98 - 107 mmol/L    Carbon Dioxide (CO2) 27 21 - 31 mmol/L    Anion Gap 7 3 - 14 mmol/L    Urea Nitrogen 15 7 - 25 mg/dL    Creatinine 0.99 0.70 - 1.30 mg/dL    Calcium 10.1 8.6 - 10.3 mg/dL    Glucose 97 70 - 105 mg/dL    GFR Estimate >90 >60 mL/min/1.73m2   TSH Reflex GH     Status: Normal   Result Value Ref Range    TSH 0.65 0.40 - 4.00 mU/L                Answers for HPI/ROS submitted by the patient on 2/28/2022  BRIANA 7 TOTAL SCORE: 10

## 2022-03-01 ASSESSMENT — ANXIETY QUESTIONNAIRES: GAD7 TOTAL SCORE: 10

## 2022-03-01 ASSESSMENT — ENCOUNTER SYMPTOMS
GASTROINTESTINAL NEGATIVE: 1
MUSCULOSKELETAL NEGATIVE: 1
CONSTITUTIONAL NEGATIVE: 1
RESPIRATORY NEGATIVE: 1
EYES NEGATIVE: 1
NEUROLOGICAL NEGATIVE: 1
CARDIOVASCULAR NEGATIVE: 1

## 2022-03-11 ENCOUNTER — HOSPITAL ENCOUNTER (OUTPATIENT)
Dept: ULTRASOUND IMAGING | Facility: OTHER | Age: 22
Discharge: HOME | End: 2022-03-11
Attending: FAMILY MEDICINE
Payer: COMMERCIAL

## 2022-03-11 ENCOUNTER — HOSPITAL ENCOUNTER (OUTPATIENT)
Dept: CARDIOLOGY | Facility: OTHER | Age: 22
Discharge: HOME | End: 2022-03-11
Attending: FAMILY MEDICINE
Payer: COMMERCIAL

## 2022-03-11 DIAGNOSIS — R01.1 SYSTOLIC MURMUR: ICD-10-CM

## 2022-03-11 DIAGNOSIS — E04.9 THYROID ENLARGEMENT: ICD-10-CM

## 2022-03-11 LAB — LVEF ECHO: NORMAL

## 2022-03-11 PROCEDURE — 93306 TTE W/DOPPLER COMPLETE: CPT

## 2022-03-11 PROCEDURE — 93306 TTE W/DOPPLER COMPLETE: CPT | Mod: 26 | Performed by: INTERNAL MEDICINE

## 2022-03-11 PROCEDURE — 76536 US EXAM OF HEAD AND NECK: CPT

## 2022-03-29 ENCOUNTER — OFFICE VISIT (OUTPATIENT)
Dept: FAMILY MEDICINE | Facility: OTHER | Age: 22
End: 2022-03-29
Attending: FAMILY MEDICINE
Payer: COMMERCIAL

## 2022-03-29 VITALS
TEMPERATURE: 98.5 F | RESPIRATION RATE: 20 BRPM | OXYGEN SATURATION: 97 % | DIASTOLIC BLOOD PRESSURE: 70 MMHG | WEIGHT: 227 LBS | SYSTOLIC BLOOD PRESSURE: 138 MMHG | HEART RATE: 104 BPM | BODY MASS INDEX: 31.66 KG/M2

## 2022-03-29 DIAGNOSIS — F41.1 GENERALIZED ANXIETY DISORDER: ICD-10-CM

## 2022-03-29 DIAGNOSIS — I10 ESSENTIAL HYPERTENSION: ICD-10-CM

## 2022-03-29 PROCEDURE — 99213 OFFICE O/P EST LOW 20 MIN: CPT | Performed by: FAMILY MEDICINE

## 2022-03-29 RX ORDER — CITALOPRAM HYDROBROMIDE 20 MG/1
20 TABLET ORAL DAILY
Qty: 30 TABLET | Refills: 1 | Status: SHIPPED | OUTPATIENT
Start: 2022-03-29 | End: 2022-05-27

## 2022-03-29 RX ORDER — AMLODIPINE BESYLATE 5 MG/1
5 TABLET ORAL DAILY
Qty: 90 TABLET | Refills: 4 | Status: SHIPPED | OUTPATIENT
Start: 2022-03-29

## 2022-03-29 ASSESSMENT — ANXIETY QUESTIONNAIRES
6. BECOMING EASILY ANNOYED OR IRRITABLE: NEARLY EVERY DAY
2. NOT BEING ABLE TO STOP OR CONTROL WORRYING: NEARLY EVERY DAY
3. WORRYING TOO MUCH ABOUT DIFFERENT THINGS: SEVERAL DAYS
5. BEING SO RESTLESS THAT IT IS HARD TO SIT STILL: NOT AT ALL
7. FEELING AFRAID AS IF SOMETHING AWFUL MIGHT HAPPEN: NOT AT ALL
IF YOU CHECKED OFF ANY PROBLEMS ON THIS QUESTIONNAIRE, HOW DIFFICULT HAVE THESE PROBLEMS MADE IT FOR YOU TO DO YOUR WORK, TAKE CARE OF THINGS AT HOME, OR GET ALONG WITH OTHER PEOPLE: SOMEWHAT DIFFICULT
GAD7 TOTAL SCORE: 11
1. FEELING NERVOUS, ANXIOUS, OR ON EDGE: MORE THAN HALF THE DAYS

## 2022-03-29 ASSESSMENT — PATIENT HEALTH QUESTIONNAIRE - PHQ9
5. POOR APPETITE OR OVEREATING: MORE THAN HALF THE DAYS
SUM OF ALL RESPONSES TO PHQ QUESTIONS 1-9: 11

## 2022-03-29 ASSESSMENT — PAIN SCALES - GENERAL: PAINLEVEL: NO PAIN (0)

## 2022-03-29 NOTE — NURSING NOTE
"Patient presents to the clinic for follow up.    FOOD SECURITY SCREENING QUESTIONS:    The next two questions are to help us understand your food security.  If you are feeling you need any assistance in this area, we have resources available to support you today.    Hunger Vital Signs:  Within the past 12 months we worried whether our food would run out before we got money to buy more. Never  Within the past 12 months the food we bought just didn't last and we didn't have money to get more. Never    Advance Care Directive on file? no  Advance Care Directive provided to patient? Declined.    Chief Complaint   Patient presents with     Clinic Care Coordination - Follow-up       Initial /70 (BP Location: Right arm, Patient Position: Sitting, Cuff Size: Adult Large)   Pulse 104   Temp 98.5  F (36.9  C) (Temporal)   Resp 20   Wt 103 kg (227 lb)   SpO2 97%   BMI 31.66 kg/m   Estimated body mass index is 31.66 kg/m  as calculated from the following:    Height as of 3/17/21: 1.803 m (5' 11\").    Weight as of this encounter: 103 kg (227 lb).  Medication Reconciliation: complete        Jocelyne Higuera LPN       "

## 2022-03-29 NOTE — PROGRESS NOTES
Assessment & Plan       ICD-10-CM    1. Generalized anxiety disorder  F41.1 citalopram (CELEXA) 20 MG tablet   2. Essential hypertension  I10 amLODIPine (NORVASC) 5 MG tablet     Fluoxetine is causing daytime sedation.  His mother takes citalopram.  Recommend trying citalopram 20 mg daily.  Let me know if side effects.  If not tolerating citalopram, would consider sertraline 50 mg daily instead.    Blood pressure controlled on current dose of amlodipine.  Refilled for 90 days, 1 year supply.     Depression Screening Follow Up    PHQ 3/29/2022   PHQ-9 Total Score 11   Q9: Thoughts of better off dead/self-harm past 2 weeks Not at all       Follow Up Actions Taken  Changed medication, follow up in a month     Return in about 1 month (around 4/29/2022).    Bandar Colon MD  [facility] is a 21 year old who presents for the following health issues     History of Present Illness       Reason for visit:  Medication Follow up/ BP check  Symptom onset:  More than a month    He eats 0-1 servings of fruits and vegetables daily.He consumes 1 sweetened beverage(s) daily.He exercises with enough effort to increase his heart rate 20 to 29 minutes per day.  He exercises with enough effort to increase his heart rate 3 or less days per week.   He is taking medications regularly.     Had cough from lisinopril, changed to amlodipine 5 mg daily.  Blood pressure controlled, similar readings at home.    Potential systolic murmur, had a normal echocardiogram March 11, 2022.    Right side of thyroid seemed enlarged, but had a normal thyroid ultrasound March 11.    Started fluoxetine 20 mg daily.  Is sleeping well, but having sedation in the daytime.  Often sleeping a couple hours.  No benefit on depression or anxiety.      BRIANA-7 SCORE 3/17/2021 2/28/2022 3/29/2022   Total Score - 10 (moderate anxiety) -   Total Score 3 10 11       PHQ-9 SCORE 8/1/2019 2/24/2021 3/17/2021 3/29/2022   PHQ-9  Total Score 3 6 4 11             Review of Systems   General: Denies general constitutional problems  Cardiovascular: Denies problems  Respiratory: Denies problems        Objective    /70 (BP Location: Right arm, Patient Position: Sitting, Cuff Size: Adult Large)   Pulse 104   Temp 98.5  F (36.9  C) (Temporal)   Resp 20   Wt 103 kg (227 lb)   SpO2 97%   BMI 31.66 kg/m    Body mass index is 31.66 kg/m .  Physical Exam   General Appearance: Alert. No acute distress  Psychiatric: Normal affect and mentation

## 2022-03-30 ASSESSMENT — ANXIETY QUESTIONNAIRES: GAD7 TOTAL SCORE: 11

## 2022-05-26 ENCOUNTER — MYC MEDICAL ADVICE (OUTPATIENT)
Dept: FAMILY MEDICINE | Facility: OTHER | Age: 22
End: 2022-05-26
Payer: COMMERCIAL

## 2022-05-26 DIAGNOSIS — F41.1 GENERALIZED ANXIETY DISORDER: ICD-10-CM

## 2022-06-10 ENCOUNTER — OFFICE VISIT (OUTPATIENT)
Dept: FAMILY MEDICINE | Facility: OTHER | Age: 22
End: 2022-06-10
Attending: PHYSICIAN ASSISTANT
Payer: COMMERCIAL

## 2022-06-10 VITALS
HEART RATE: 107 BPM | WEIGHT: 235.2 LBS | HEIGHT: 71 IN | BODY MASS INDEX: 32.93 KG/M2 | RESPIRATION RATE: 18 BRPM | TEMPERATURE: 98 F | SYSTOLIC BLOOD PRESSURE: 134 MMHG | DIASTOLIC BLOOD PRESSURE: 76 MMHG | OXYGEN SATURATION: 97 %

## 2022-06-10 DIAGNOSIS — J06.9 VIRAL URI: Primary | ICD-10-CM

## 2022-06-10 PROCEDURE — 99213 OFFICE O/P EST LOW 20 MIN: CPT | Performed by: PHYSICIAN ASSISTANT

## 2022-06-10 ASSESSMENT — PAIN SCALES - GENERAL: PAINLEVEL: MILD PAIN (3)

## 2022-06-10 NOTE — PATIENT INSTRUCTIONS
No acute concerns today, examination is normal which is extremely reassuring today. Continue to push fluids, rest.

## 2022-06-10 NOTE — PROGRESS NOTES
ASSESSMENT/PLAN:    I have reviewed the nursing notes.  I have reviewed the findings, diagnosis, plan and need for follow up with the patient.    1. Viral URI  - Vital signs stable. PE consistent with URI.     Recommend: increased fluids, rest, use of humidifier, antitussives (cough medication for adults), alternating tylenol and ibuprofen every 4-6 hours as needed (if able to take these medications), salt water gargles for sore throats or throat lozenges, honey, netti pots/saline rinses for sinus/congestion, as well as other home remedies. If worsening fevers, chills, uncontrollable nausea/vomiting, dehydration,etc., or other worsening signs occur, patient is agreeable to follow up for reevaluation.     Patient is in agreement and understanding of the above treatment plan. All questions and concerns were addressed and answered to patient's satisfaction. AVS reviewed with patient.     Discussed warning signs/symptoms indicative of need to f/u    Follow up if symptoms persist or worsen or concerns    I explained my diagnostic considerations and recommendations to the patient, who voiced understanding and agreement with the treatment plan. All questions were answered. We discussed potential side effects of any prescribed or recommended therapies, as well as expectations for response to treatments.    Yolanda Vincent PA-C  6/10/2022  10:39 AM    HPI:    Yonny Vargas is a 21 year old male  who presents to [facility] today for concerns of headache, off and on fevers, and nausea that started last Friday.    Off and on fevers - 102.6 F last weekend, then felt OK Monday and Tuesday. Wednesday had myalgias and 101 F and strong headache (extreme headache) + nausea. Felt OK yesterday, other than dark urine coloration and fever last night 100.6 F throughout night and has returned to normal this AM. Felt sick this AM.     Headaches - mild to severity, can lead to eye pain as well  Myalgias - intermittent  Nausea -  "epigastric  Thirst increased  Increase shortness of breath at times with walking    Denies sore throat, runny nose.    No rash. Negative COVID tests at home.     Doing well with Amlodipine. No palpitations, concerns of elevated BP.     Past Medical History:   Diagnosis Date     Epilepsy (H)     2009,mild     Past Surgical History:   Procedure Laterality Date     OTHER SURGICAL HISTORY      [id number],NO PREVIOUS SURGERY     Social History     Tobacco Use     Smoking status: Never Smoker     Smokeless tobacco: Never Used   Substance Use Topics     Alcohol use: No     Current Outpatient Medications   Medication Sig Dispense Refill     amLODIPine (NORVASC) 5 MG tablet Take 1 tablet (5 mg) by mouth daily 90 tablet 4     sertraline (ZOLOFT) 50 MG tablet Take 1 tablet (50 mg) by mouth daily 30 tablet 1     Allergies   Allergen Reactions     Amoxicillin-Pot Clavulanate Hives     Cefprozil Hives     Cefzil     Past medical history, past surgical history, current medications and allergies reviewed and accurate to the best of my knowledge.      ROS:  Refer to HPI    /76   Pulse 107   Temp 98  F (36.7  C) (Tympanic)   Resp 18   Ht 1.803 m (5' 11\")   Wt 106.7 kg (235 lb 3.2 oz)   SpO2 97%   BMI 32.80 kg/m      EXAM:  General Appearance: Well appearing 21 year old male, appropriate appearance for age. No acute distress   Ears: Left TM intact, translucent with bony landmarks appreciated, no erythema, no effusion, no bulging, no purulence.  Right TM intact, translucent with bony landmarks appreciated, no erythema, no effusion, no bulging, no purulence.  Left auditory canal clear.  Right auditory canal clear.  Normal external ears, non tender.  Eyes: conjunctivae normal without erythema or irritation, corneas clear, no drainage or crusting, no eyelid swelling, pupils equal   Oropharynx: moist mucous membranes, posterior pharynx without erythema, tonsils symmetric, no erythema, no exudates or petechiae, no post nasal drip " seen, no trismus, voice clear.    Sinuses:  No sinus tenderness upon palpation of the frontal or maxillary sinuses  Nose:  Bilateral nares: no erythema, no edema, no drainage or congestion   Neck: supple without adenopathy  Respiratory: normal chest wall and respirations.  Normal effort.  Clear to auscultation bilaterally, no wheezing, crackles or rhonchi.  No increased work of breathing.  No cough appreciated.  Cardiac: RRR with no murmurs  Abdomen: soft, nontender, no rigidity, no rebound tenderness or guarding, normal bowel sounds present  :  No suprapubic tenderness to palpation.  Absent CVA tenderness to palpation.    Musculoskeletal:  Equal movement of bilateral upper extremities.  Equal movement of bilateral lower extremities.  Normal gait.    Dermatological: no rashes noted of exposed skin  Psychological: normal affect, alert, oriented, and pleasant.     Labs:  None     Xray:  None

## 2022-06-10 NOTE — NURSING NOTE
"Chief Complaint   Patient presents with     Headache     Headache, fever, and nausea     Patient presents to the clinic for headache, off and on fevers, and nausea that started last Friday.     Initial /76   Pulse 107   Temp 98  F (36.7  C) (Tympanic)   Resp 18   Ht 1.803 m (5' 11\")   Wt 106.7 kg (235 lb 3.2 oz)   SpO2 97%   BMI 32.80 kg/m   Estimated body mass index is 32.8 kg/m  as calculated from the following:    Height as of this encounter: 1.803 m (5' 11\").    Weight as of this encounter: 106.7 kg (235 lb 3.2 oz).  Medication Reconciliation: complete    FOOD SECURITY SCREENING QUESTIONS  Hunger Vital Signs:  Within the past 12 months we worried whether our food would run out before we got money to buy more. Never  Within the past 12 months the food we bought just didn't last and we didn't have money to get more. Never  Laura Johnson LPN 6/10/2022 10:25 AM      Laura Johnson LPN    "

## 2022-07-25 ENCOUNTER — MYC MEDICAL ADVICE (OUTPATIENT)
Dept: FAMILY MEDICINE | Facility: OTHER | Age: 22
End: 2022-07-25

## 2022-07-25 DIAGNOSIS — F41.1 GENERALIZED ANXIETY DISORDER: ICD-10-CM

## 2022-08-10 ENCOUNTER — OFFICE VISIT (OUTPATIENT)
Dept: FAMILY MEDICINE | Facility: OTHER | Age: 22
End: 2022-08-10
Attending: FAMILY MEDICINE
Payer: COMMERCIAL

## 2022-08-10 VITALS
SYSTOLIC BLOOD PRESSURE: 120 MMHG | RESPIRATION RATE: 16 BRPM | BODY MASS INDEX: 32.36 KG/M2 | HEART RATE: 72 BPM | TEMPERATURE: 97.1 F | WEIGHT: 232 LBS | DIASTOLIC BLOOD PRESSURE: 82 MMHG | OXYGEN SATURATION: 98 %

## 2022-08-10 DIAGNOSIS — F41.1 GENERALIZED ANXIETY DISORDER: ICD-10-CM

## 2022-08-10 PROCEDURE — 99213 OFFICE O/P EST LOW 20 MIN: CPT | Performed by: FAMILY MEDICINE

## 2022-08-10 ASSESSMENT — ANXIETY QUESTIONNAIRES
GAD7 TOTAL SCORE: 6
2. NOT BEING ABLE TO STOP OR CONTROL WORRYING: SEVERAL DAYS
GAD7 TOTAL SCORE: 6
3. WORRYING TOO MUCH ABOUT DIFFERENT THINGS: SEVERAL DAYS
1. FEELING NERVOUS, ANXIOUS, OR ON EDGE: SEVERAL DAYS
5. BEING SO RESTLESS THAT IT IS HARD TO SIT STILL: NOT AT ALL
7. FEELING AFRAID AS IF SOMETHING AWFUL MIGHT HAPPEN: NOT AT ALL
8. IF YOU CHECKED OFF ANY PROBLEMS, HOW DIFFICULT HAVE THESE MADE IT FOR YOU TO DO YOUR WORK, TAKE CARE OF THINGS AT HOME, OR GET ALONG WITH OTHER PEOPLE?: SOMEWHAT DIFFICULT
IF YOU CHECKED OFF ANY PROBLEMS ON THIS QUESTIONNAIRE, HOW DIFFICULT HAVE THESE PROBLEMS MADE IT FOR YOU TO DO YOUR WORK, TAKE CARE OF THINGS AT HOME, OR GET ALONG WITH OTHER PEOPLE: SOMEWHAT DIFFICULT
GAD7 TOTAL SCORE: 6
7. FEELING AFRAID AS IF SOMETHING AWFUL MIGHT HAPPEN: NOT AT ALL
6. BECOMING EASILY ANNOYED OR IRRITABLE: NEARLY EVERY DAY
4. TROUBLE RELAXING: NOT AT ALL

## 2022-08-10 ASSESSMENT — PAIN SCALES - GENERAL: PAINLEVEL: NO PAIN (0)

## 2022-08-10 ASSESSMENT — PATIENT HEALTH QUESTIONNAIRE - PHQ9
SUM OF ALL RESPONSES TO PHQ QUESTIONS 1-9: 9
SUM OF ALL RESPONSES TO PHQ QUESTIONS 1-9: 9
10. IF YOU CHECKED OFF ANY PROBLEMS, HOW DIFFICULT HAVE THESE PROBLEMS MADE IT FOR YOU TO DO YOUR WORK, TAKE CARE OF THINGS AT HOME, OR GET ALONG WITH OTHER PEOPLE: NOT DIFFICULT AT ALL

## 2022-08-10 NOTE — PROGRESS NOTES
Assessment & Plan       ICD-10-CM    1. Generalized anxiety disorder  F41.1 sertraline (ZOLOFT) 50 MG tablet     Has noticed improvement with sertraline at 50 mg.  However, BRIANA and PHQ scores are slightly elevated.  Goal is under 5.  He is not having any side effects.  Increase sertraline to 75 mg daily.  If this causes side effects, then reduce back to 50.  Otherwise, continue at 75 mg and follow-up in about 6 weeks in person, phone, or even a [employer] E-visit if desired.      Return in about 6 weeks (around 9/21/2022) for using a [employer] ([employer]isit.    Bandar Colon MD  [facility]   Yonny is a 22 year old, presenting for the following health issues:  Recheck Medication      History of Present Illness       Reason for visit:  Med dosage follow up    He eats 0-1 servings of fruits and vegetables daily.He consumes 1 sweetened beverage(s) daily.He exercises with enough effort to increase his heart rate 9 or less minutes per day.  He exercises with enough effort to increase his heart rate 3 or less days per week.   He is taking medications regularly.    Today's PHQ-9         PHQ-9 Total Score: 9    PHQ-9 Q9 Thoughts of better off dead/self-harm past 2 weeks :   Not at all    How difficult have these problems made it for you to do your work, take care of things at home, or get along with other people: Not difficult at all  Today's BRIANA-7 Score: 6     Sertraline is working well for anxiety.  He had side effects with fluoxetine and citalopram.    Blood pressure is well controlled with amlodipine        Review of Systems   General: Denies general constitutional problems  Cardiovascular: Denies problems  Respiratory: Denies problems       Objective    /82 (BP Location: Right arm, Patient Position: Sitting, Cuff Size: Adult Large)   Pulse 72   Temp 97.1  F (36.2  C) (Tympanic)   Resp 16   Wt 105.2 kg (232 lb)   SpO2 98%   BMI 32.36 kg/m    Body mass index is 32.36  kg/m .  Physical Exam   General Appearance: Alert. No acute distress  Psychiatric: Normal affect and mentation                      .  ..

## 2022-08-10 NOTE — NURSING NOTE
"Patient presents to the clinic for medication follow up.    FOOD SECURITY SCREENING QUESTIONS:    The next two questions are to help us understand your food security.  If you are feeling you need any assistance in this area, we have resources available to support you today.    Hunger Vital Signs:  Within the past 12 months we worried whether our food would run out before we got money to buy more. Never  Within the past 12 months the food we bought just didn't last and we didn't have money to get more. Never    Advance Care Directive on file? no  Advance Care Directive provided to patient? Yes.    Chief Complaint   Patient presents with     Recheck Medication       Initial /82 (BP Location: Right arm, Patient Position: Sitting, Cuff Size: Adult Large)   Pulse 72   Temp 97.1  F (36.2  C) (Tympanic)   Resp 16   Wt 105.2 kg (232 lb)   SpO2 98%   BMI 32.36 kg/m   Estimated body mass index is 32.36 kg/m  as calculated from the following:    Height as of 6/10/22: 1.803 m (5' 11\").    Weight as of this encounter: 105.2 kg (232 lb).  Medication Reconciliation: complete        Jocelyne Higuera LPN       "

## 2022-08-23 ENCOUNTER — MYC MEDICAL ADVICE (OUTPATIENT)
Dept: FAMILY MEDICINE | Facility: OTHER | Age: 22
End: 2022-08-23

## 2022-09-04 ENCOUNTER — HEALTH MAINTENANCE LETTER (OUTPATIENT)
Age: 22
End: 2022-09-04

## 2022-09-12 ENCOUNTER — TELEPHONE (OUTPATIENT)
Dept: FAMILY MEDICINE | Facility: OTHER | Age: 22
End: 2022-09-12

## 2022-09-13 ENCOUNTER — MYC MEDICAL ADVICE (OUTPATIENT)
Dept: FAMILY MEDICINE | Facility: OTHER | Age: 22
End: 2022-09-13

## 2022-09-21 DIAGNOSIS — F41.1 GENERALIZED ANXIETY DISORDER: ICD-10-CM

## 2022-09-22 RX ORDER — CITALOPRAM HYDROBROMIDE 20 MG/1
TABLET ORAL
Qty: 30 TABLET | Refills: 1 | OUTPATIENT
Start: 2022-09-22

## 2022-09-22 NOTE — TELEPHONE ENCOUNTER
[facility] sent Rx request for the following:      CITALOPRAM 20MG TABLETS      Last Prescription Date:   3/29/2022 (dc'd 5/27/22)  Last Fill Qty/Refills:         30, R-1    Last Office Visit:              8/10/2022   Future Office visit:           none      Medication was discontinued on 5/27/22 per [employer] message between patient and PCP due to patient stating medication was not working. Pharmacy notified. Fletcher Ellis RN, BSN  ....................  9/22/2022   2:49 PM

## 2022-10-31 ENCOUNTER — TELEPHONE (OUTPATIENT)
Dept: FAMILY MEDICINE | Facility: OTHER | Age: 22
End: 2022-10-31

## 2022-10-31 NOTE — TELEPHONE ENCOUNTER
Please assist patient with an office visit for follow up with medication refills.      Jocelyne Higuera LPN 10/31/2022 8:50 AM

## 2022-11-01 NOTE — TELEPHONE ENCOUNTER
LMTCB to schedule appointment.    Appointment needed for medication refills.    Perlita Carmichael on 11/1/2022 at 3:15 PM

## 2022-11-15 NOTE — TELEPHONE ENCOUNTER
Patient is scheduled for an appointment with Bandar Colon MD on 11-.      Jocelyne Higuera LPN 11/15/2022 11:33 AM

## 2022-11-23 ASSESSMENT — ANXIETY QUESTIONNAIRES
4. TROUBLE RELAXING: MORE THAN HALF THE DAYS
7. FEELING AFRAID AS IF SOMETHING AWFUL MIGHT HAPPEN: NOT AT ALL
3. WORRYING TOO MUCH ABOUT DIFFERENT THINGS: NEARLY EVERY DAY
GAD7 TOTAL SCORE: 12
GAD7 TOTAL SCORE: 12
7. FEELING AFRAID AS IF SOMETHING AWFUL MIGHT HAPPEN: NOT AT ALL
GAD7 TOTAL SCORE: 12
IF YOU CHECKED OFF ANY PROBLEMS ON THIS QUESTIONNAIRE, HOW DIFFICULT HAVE THESE PROBLEMS MADE IT FOR YOU TO DO YOUR WORK, TAKE CARE OF THINGS AT HOME, OR GET ALONG WITH OTHER PEOPLE: SOMEWHAT DIFFICULT
2. NOT BEING ABLE TO STOP OR CONTROL WORRYING: SEVERAL DAYS
5. BEING SO RESTLESS THAT IT IS HARD TO SIT STILL: NOT AT ALL
8. IF YOU CHECKED OFF ANY PROBLEMS, HOW DIFFICULT HAVE THESE MADE IT FOR YOU TO DO YOUR WORK, TAKE CARE OF THINGS AT HOME, OR GET ALONG WITH OTHER PEOPLE?: SOMEWHAT DIFFICULT
6. BECOMING EASILY ANNOYED OR IRRITABLE: NEARLY EVERY DAY
1. FEELING NERVOUS, ANXIOUS, OR ON EDGE: NEARLY EVERY DAY

## 2022-11-28 ASSESSMENT — PATIENT HEALTH QUESTIONNAIRE - PHQ9
SUM OF ALL RESPONSES TO PHQ QUESTIONS 1-9: 11
10. IF YOU CHECKED OFF ANY PROBLEMS, HOW DIFFICULT HAVE THESE PROBLEMS MADE IT FOR YOU TO DO YOUR WORK, TAKE CARE OF THINGS AT HOME, OR GET ALONG WITH OTHER PEOPLE: SOMEWHAT DIFFICULT
SUM OF ALL RESPONSES TO PHQ QUESTIONS 1-9: 11

## 2022-11-29 ENCOUNTER — OFFICE VISIT (OUTPATIENT)
Dept: FAMILY MEDICINE | Facility: OTHER | Age: 22
End: 2022-11-29
Attending: FAMILY MEDICINE
Payer: COMMERCIAL

## 2022-11-29 VITALS
BODY MASS INDEX: 34.31 KG/M2 | TEMPERATURE: 98.4 F | WEIGHT: 246 LBS | RESPIRATION RATE: 20 BRPM | DIASTOLIC BLOOD PRESSURE: 88 MMHG | HEART RATE: 108 BPM | OXYGEN SATURATION: 98 % | SYSTOLIC BLOOD PRESSURE: 138 MMHG

## 2022-11-29 DIAGNOSIS — F41.1 GENERALIZED ANXIETY DISORDER: ICD-10-CM

## 2022-11-29 PROCEDURE — 99213 OFFICE O/P EST LOW 20 MIN: CPT | Performed by: FAMILY MEDICINE

## 2022-11-29 RX ORDER — TRAZODONE HYDROCHLORIDE 50 MG/1
25-50 TABLET, FILM COATED ORAL
Qty: 30 TABLET | Refills: 1 | Status: SHIPPED | OUTPATIENT
Start: 2022-11-29

## 2022-11-29 RX ORDER — SERTRALINE HYDROCHLORIDE 100 MG/1
100 TABLET, FILM COATED ORAL DAILY
Qty: 30 TABLET | Refills: 1 | Status: SHIPPED | OUTPATIENT
Start: 2022-11-29

## 2022-11-29 ASSESSMENT — PATIENT HEALTH QUESTIONNAIRE - PHQ9
10. IF YOU CHECKED OFF ANY PROBLEMS, HOW DIFFICULT HAVE THESE PROBLEMS MADE IT FOR YOU TO DO YOUR WORK, TAKE CARE OF THINGS AT HOME, OR GET ALONG WITH OTHER PEOPLE: SOMEWHAT DIFFICULT
SUM OF ALL RESPONSES TO PHQ QUESTIONS 1-9: 11

## 2022-11-29 ASSESSMENT — ANXIETY QUESTIONNAIRES: GAD7 TOTAL SCORE: 12

## 2022-11-29 ASSESSMENT — PAIN SCALES - GENERAL: PAINLEVEL: NO PAIN (0)

## 2022-11-29 NOTE — PROGRESS NOTES
Assessment & Plan       ICD-10-CM    1. Generalized anxiety disorder  F41.1 sertraline (ZOLOFT) 100 MG tablet     traZODone (DESYREL) 50 MG tablet        Not doing as well. Uncertain why. Is starting school again, so maybe more anxious about that?  Not sleeping well  Tolerating sertraline. PHQ and BRIANA are higher despite increase in sertraline dose a few months ago.  Reviewed options. Increase sertraline from 75 to 100 mg daily  Trazodone as needed for sleep.   Contact me if having side effects  Follow up in a month    Depression Screening Follow Up    PHQ 11/28/2022   PHQ-9 Total Score 11   Q9: Thoughts of better off dead/self-harm past 2 weeks Not at all       Follow Up Actions Taken  As above    Bandar Colon MD   [facility]     Joe Blancas is a 22 year old, presenting for the following health issues:  Clinic Care Coordination - Follow-up (Mental health/)      History of Present Illness       Mental Health Follow-up:  Patient presents to follow-up on Anxiety.    Patient's anxiety since last visit has been:  No change  The patient is not having other symptoms associated with anxiety.  Any significant life events: No  Patient is not feeling anxious or having panic attacks.  Patient has no concerns about alcohol or drug use.    He eats 0-1 servings of fruits and vegetables daily.He consumes 2 sweetened beverage(s) daily.He exercises with enough effort to increase his heart rate 10 to 19 minutes per day.  He exercises with enough effort to increase his heart rate 4 days per week.   He is taking medications regularly.    Today's PHQ-9         PHQ-9 Total Score: 11    PHQ-9 Q9 Thoughts of better off dead/self-harm past 2 weeks :   Not at all    How difficult have these problems made it for you to do your work, take care of things at home, or get along with other people: Somewhat difficult  Today's BRIANA-7 Score: 12     PHQ-9 SCORE 3/17/2021 3/29/2022 8/10/2022 11/28/2022   PHQ-9 Total  Score MyChart - - 9 (Mild depression) 11 (Moderate depression)   PHQ-9 Total Score 4 11 9 11     BRIANA-7 SCORE 3/29/2022 8/10/2022 11/23/2022   Total Score - 6 (mild anxiety) 12 (moderate anxiety)   Total Score 11 6 12       Review of Systems   As above      Objective    /88 (BP Location: Right arm, Patient Position: Sitting, Cuff Size: Adult Large)   Pulse 108   Temp 98.4  F (36.9  C) (Tympanic)   Resp 20   Wt 111.6 kg (246 lb)   SpO2 98%   BMI 34.31 kg/m    Body mass index is 34.31 kg/m .  Physical Exam   General Appearance: Alert. No acute distress  Psychiatric: Normal affect and mentation

## 2023-04-29 ENCOUNTER — HEALTH MAINTENANCE LETTER (OUTPATIENT)
Age: 23
End: 2023-04-29

## 2024-07-06 ENCOUNTER — HEALTH MAINTENANCE LETTER (OUTPATIENT)
Age: 24
End: 2024-07-06

## 2025-05-09 ENCOUNTER — OFFICE VISIT (OUTPATIENT)
Dept: FAMILY MEDICINE | Facility: OTHER | Age: 25
End: 2025-05-09
Attending: NURSE PRACTITIONER
Payer: COMMERCIAL

## 2025-05-09 VITALS
OXYGEN SATURATION: 98 % | WEIGHT: 250.4 LBS | TEMPERATURE: 97.9 F | HEIGHT: 71 IN | RESPIRATION RATE: 18 BRPM | DIASTOLIC BLOOD PRESSURE: 106 MMHG | SYSTOLIC BLOOD PRESSURE: 154 MMHG | BODY MASS INDEX: 35.06 KG/M2 | HEART RATE: 130 BPM

## 2025-05-09 DIAGNOSIS — R00.0 TACHYCARDIA: ICD-10-CM

## 2025-05-09 DIAGNOSIS — R11.2 NAUSEA AND VOMITING, UNSPECIFIED VOMITING TYPE: ICD-10-CM

## 2025-05-09 DIAGNOSIS — R03.0 ELEVATED BLOOD PRESSURE READING WITHOUT DIAGNOSIS OF HYPERTENSION: ICD-10-CM

## 2025-05-09 DIAGNOSIS — Z13.29 SCREENING FOR THYROID DISORDER: ICD-10-CM

## 2025-05-09 DIAGNOSIS — R10.13 EPIGASTRIC PAIN: Primary | ICD-10-CM

## 2025-05-09 LAB
ALBUMIN SERPL BCG-MCNC: 4.3 G/DL (ref 3.5–5.2)
ALP SERPL-CCNC: 55 U/L (ref 40–150)
ALT SERPL W P-5'-P-CCNC: 52 U/L (ref 0–70)
AMYLASE SERPL-CCNC: 47 U/L (ref 28–100)
ANION GAP SERPL CALCULATED.3IONS-SCNC: 14 MMOL/L (ref 7–15)
AST SERPL W P-5'-P-CCNC: 33 U/L (ref 0–45)
BASOPHILS # BLD AUTO: 0.1 10E3/UL (ref 0–0.2)
BASOPHILS NFR BLD AUTO: 0 %
BILIRUB SERPL-MCNC: 1.1 MG/DL
BUN SERPL-MCNC: 18.1 MG/DL (ref 6–20)
CALCIUM SERPL-MCNC: 9.2 MG/DL (ref 8.8–10.4)
CHLORIDE SERPL-SCNC: 102 MMOL/L (ref 98–107)
CREAT SERPL-MCNC: 1.02 MG/DL (ref 0.67–1.17)
EGFRCR SERPLBLD CKD-EPI 2021: >90 ML/MIN/1.73M2
EOSINOPHIL # BLD AUTO: 0.6 10E3/UL (ref 0–0.7)
EOSINOPHIL NFR BLD AUTO: 5 %
ERYTHROCYTE [DISTWIDTH] IN BLOOD BY AUTOMATED COUNT: 12.5 % (ref 10–15)
GLUCOSE SERPL-MCNC: 94 MG/DL (ref 70–99)
HCO3 SERPL-SCNC: 22 MMOL/L (ref 22–29)
HCT VFR BLD AUTO: 51.7 % (ref 40–53)
HGB BLD-MCNC: 19.4 G/DL (ref 13.3–17.7)
IMM GRANULOCYTES # BLD: 0.1 10E3/UL
IMM GRANULOCYTES NFR BLD: 0 %
LIPASE SERPL-CCNC: 16 U/L (ref 13–60)
LYMPHOCYTES # BLD AUTO: 4.5 10E3/UL (ref 0.8–5.3)
LYMPHOCYTES NFR BLD AUTO: 34 %
MCH RBC QN AUTO: 30.1 PG (ref 26.5–33)
MCHC RBC AUTO-ENTMCNC: 37.5 G/DL (ref 31.5–36.5)
MCV RBC AUTO: 80 FL (ref 78–100)
MONOCYTES # BLD AUTO: 0.7 10E3/UL (ref 0–1.3)
MONOCYTES NFR BLD AUTO: 5 %
NEUTROPHILS # BLD AUTO: 7.4 10E3/UL (ref 1.6–8.3)
NEUTROPHILS NFR BLD AUTO: 56 %
NRBC # BLD AUTO: 0 10E3/UL
NRBC BLD AUTO-RTO: 0 /100
PLATELET # BLD AUTO: 326 10E3/UL (ref 150–450)
POTASSIUM SERPL-SCNC: 3.8 MMOL/L (ref 3.4–5.3)
PROT SERPL-MCNC: 7.4 G/DL (ref 6.4–8.3)
RBC # BLD AUTO: 6.45 10E6/UL (ref 4.4–5.9)
SODIUM SERPL-SCNC: 138 MMOL/L (ref 135–145)
TSH SERPL DL<=0.005 MIU/L-ACNC: 0.76 UIU/ML (ref 0.3–4.2)
WBC # BLD AUTO: 13.3 10E3/UL (ref 4–11)

## 2025-05-09 PROCEDURE — 80053 COMPREHEN METABOLIC PANEL: CPT | Mod: ZL | Performed by: NURSE PRACTITIONER

## 2025-05-09 PROCEDURE — 84443 ASSAY THYROID STIM HORMONE: CPT | Mod: ZL | Performed by: NURSE PRACTITIONER

## 2025-05-09 PROCEDURE — 83690 ASSAY OF LIPASE: CPT | Mod: ZL | Performed by: NURSE PRACTITIONER

## 2025-05-09 PROCEDURE — 36415 COLL VENOUS BLD VENIPUNCTURE: CPT | Mod: ZL | Performed by: NURSE PRACTITIONER

## 2025-05-09 PROCEDURE — 82150 ASSAY OF AMYLASE: CPT | Mod: ZL | Performed by: NURSE PRACTITIONER

## 2025-05-09 PROCEDURE — 85025 COMPLETE CBC W/AUTO DIFF WBC: CPT | Mod: ZL | Performed by: NURSE PRACTITIONER

## 2025-05-09 RX ORDER — SUCRALFATE 1 G/1
TABLET ORAL
COMMUNITY
Start: 2025-05-07

## 2025-05-09 RX ORDER — ONDANSETRON 8 MG/1
8 TABLET, ORALLY DISINTEGRATING ORAL EVERY 8 HOURS PRN
Qty: 30 TABLET | Refills: 0 | Status: SHIPPED | OUTPATIENT
Start: 2025-05-09

## 2025-05-09 RX ORDER — OMEPRAZOLE 40 MG/1
40 CAPSULE, DELAYED RELEASE ORAL DAILY
Qty: 30 CAPSULE | Refills: 1 | Status: SHIPPED | OUTPATIENT
Start: 2025-05-09

## 2025-05-09 ASSESSMENT — PAIN SCALES - GENERAL: PAINLEVEL_OUTOF10: MODERATE PAIN (5)

## 2025-05-09 NOTE — NURSING NOTE
Patient presents today for abdominal pain that started 5 days ago.    Medication Reconciliation Complete    Yamilka Tan LPN  5/9/2025 3:00 PM

## 2025-05-09 NOTE — PROGRESS NOTES
Assessment & Plan   Problem List Items Addressed This Visit    None  Visit Diagnoses         Epigastric pain    -  Primary    Relevant Medications    ondansetron (ZOFRAN ODT) 8 MG ODT tab    omeprazole (PRILOSEC) 40 MG DR capsule    Other Relevant Orders    CBC and Differential (Completed)    Comprehensive Metabolic Panel (Completed)    Lipase (Completed)    Amylase (Completed)    US Abdomen Complete      Nausea and vomiting, unspecified vomiting type        Relevant Medications    ondansetron (ZOFRAN ODT) 8 MG ODT tab    omeprazole (PRILOSEC) 40 MG DR capsule    Other Relevant Orders    CBC and Differential (Completed)    Comprehensive Metabolic Panel (Completed)    Lipase (Completed)    Amylase (Completed)    US Abdomen Complete      Screening for thyroid disorder        Relevant Orders    TSH Reflex GH (Completed)      Elevated blood pressure reading without diagnosis of hypertension          Tachycardia               Labs updated today including CBC, CMP, lipase and amylase.  These were normal.  They would level also obtain seems routine blood work and he is having hypertension and tachycardia.  I suspect the hypertension tachycardia are primarily due to the anxiety and abdominal pain but may need to be treated in the future.  For today we will treat him with omeprazole 40 mg daily, Zofran as needed, continue with Carafate.  Abdominal ultrasound ordered.  Would like to see him back next week for recheck, sooner with any concerns.         Joe Blancas is a 24 year old, presenting for the following health issues:  Abdominal Pain    History of Present Illness       Reason for visit:  Stomach pain gi issues  Symptom onset:  3-7 days ago  Symptom intensity:  Moderate  Symptom progression:  Worsening  Had these symptoms before:  No  What makes it worse:  Na  What makes it better:  Rest   He is taking medications regularly.        He presents to clinic today for abdominal pain that started on Sunday.  He had  "epigastric pain, radiating some across his abdomen.  Seem to worsen on Monday, waxing and waning.  Started Carafate on Wednesday.  Last 2 days he has had nausea, vomiting which is consistent with bile.  Feels worse after he is eating regardless of what type of food he eats.  He did have some heartburn symptoms last week.  Bowel movements are usually regular, last bowel movement was yesterday and the previous was a few days before.  He has not had any dark or black stools not, no coffee-ground emesis.  Does have history of hypertension, not currently taking medications for this.        Objective    BP (!) 154/106   Pulse (!) 130   Temp 97.9  F (36.6  C)   Resp 18   Ht 1.803 m (5' 11\")   Wt 113.6 kg (250 lb 6.4 oz)   SpO2 98%   BMI 34.92 kg/m    Body mass index is 34.92 kg/m .  Physical Exam   GENERAL: alert and no distress  EYES: Eyes grossly normal to inspection  RESP: lungs clear to auscultation - no rales, rhonchi or wheezes  CV: regular rate and rhythm, normal S1 S2, no S3 or S4, no murmur, click or rub, no peripheral edema  ABDOMEN: soft, nontender, no hepatosplenomegaly, no masses and bowel sounds normal  NEURO: Normal strength and tone, mentation intact and speech normal  PSYCH: mentation appears normal, affect normal/bright    Results for orders placed or performed in visit on 05/09/25   Comprehensive Metabolic Panel     Status: Normal   Result Value Ref Range    Sodium 138 135 - 145 mmol/L    Potassium 3.8 3.4 - 5.3 mmol/L    Carbon Dioxide (CO2) 22 22 - 29 mmol/L    Anion Gap 14 7 - 15 mmol/L    Urea Nitrogen 18.1 6.0 - 20.0 mg/dL    Creatinine 1.02 0.67 - 1.17 mg/dL    GFR Estimate >90 >60 mL/min/1.73m2    Calcium 9.2 8.8 - 10.4 mg/dL    Chloride 102 98 - 107 mmol/L    Glucose 94 70 - 99 mg/dL    Alkaline Phosphatase 55 40 - 150 U/L    AST 33 0 - 45 U/L    ALT 52 0 - 70 U/L    Protein Total 7.4 6.4 - 8.3 g/dL    Albumin 4.3 3.5 - 5.2 g/dL    Bilirubin Total 1.1 <=1.2 mg/dL   Lipase     Status: " Normal   Result Value Ref Range    Lipase 16 13 - 60 U/L   Amylase     Status: Normal   Result Value Ref Range    Amylase 47 28 - 100 U/L   CBC with platelets and differential     Status: Abnormal   Result Value Ref Range    WBC Count 13.3 (H) 4.0 - 11.0 10e3/uL    RBC Count 6.45 (H) 4.40 - 5.90 10e6/uL    Hemoglobin 19.4 (H) 13.3 - 17.7 g/dL    Hematocrit 51.7 40.0 - 53.0 %    MCV 80 78 - 100 fL    MCH 30.1 26.5 - 33.0 pg    MCHC 37.5 (H) 31.5 - 36.5 g/dL    RDW 12.5 10.0 - 15.0 %    Platelet Count 326 150 - 450 10e3/uL    % Neutrophils 56 %    % Lymphocytes 34 %    % Monocytes 5 %    % Eosinophils 5 %    % Basophils 0 %    % Immature Granulocytes 0 %    NRBCs per 100 WBC 0 <1 /100    Absolute Neutrophils 7.4 1.6 - 8.3 10e3/uL    Absolute Lymphocytes 4.5 0.8 - 5.3 10e3/uL    Absolute Monocytes 0.7 0.0 - 1.3 10e3/uL    Absolute Eosinophils 0.6 0.0 - 0.7 10e3/uL    Absolute Basophils 0.1 0.0 - 0.2 10e3/uL    Absolute Immature Granulocytes 0.1 <=0.4 10e3/uL    Absolute NRBCs 0.0 10e3/uL   TSH Reflex GH     Status: Normal   Result Value Ref Range    TSH 0.76 0.30 - 4.20 uIU/mL   CBC and Differential     Status: Abnormal    Narrative    The following orders were created for panel order CBC and Differential.  Procedure                               Abnormality         Status                     ---------                               -----------         ------                     CBC with platelets and ...[[id number]]  Abnormal            Final result                 Please view results for these tests on the individual orders.             Signed Electronically by: KT Win CNP

## 2025-05-14 ENCOUNTER — OFFICE VISIT (OUTPATIENT)
Dept: FAMILY MEDICINE | Facility: OTHER | Age: 25
End: 2025-05-14
Attending: NURSE PRACTITIONER
Payer: COMMERCIAL

## 2025-05-14 VITALS
BODY MASS INDEX: 35.14 KG/M2 | OXYGEN SATURATION: 98 % | HEIGHT: 71 IN | RESPIRATION RATE: 16 BRPM | WEIGHT: 251 LBS | SYSTOLIC BLOOD PRESSURE: 131 MMHG | HEART RATE: 92 BPM | DIASTOLIC BLOOD PRESSURE: 86 MMHG

## 2025-05-14 DIAGNOSIS — R10.13 EPIGASTRIC PAIN: Primary | ICD-10-CM

## 2025-05-14 DIAGNOSIS — R03.0 ELEVATED BLOOD PRESSURE READING WITHOUT DIAGNOSIS OF HYPERTENSION: ICD-10-CM

## 2025-05-14 ASSESSMENT — PAIN SCALES - GENERAL: PAINLEVEL_OUTOF10: NO PAIN (0)

## 2025-05-14 NOTE — PROGRESS NOTES
Assessment & Plan   Problem List Items Addressed This Visit    None  Visit Diagnoses         Epigastric pain    -  Primary      Elevated blood pressure reading without diagnosis of hypertension            Epigastric pain has significantly improved and almost fully resolved with starting medications.  He has ultrasound scheduled in 2 weeks, plan to continue moving forward with this.  Recommend omeprazole 40 mg daily for 1 month, may reduce to 20 mg for 1 to 2 months and then discontinue versus transitioning to famotidine.    Home blood pressure readings are under good control, blood pressure reading clinic today was mildly elevated but reading this morning at home was stable.  Continue to spot check this, follow-up if this is changing or worsening.    The longitudinal plan of care for the diagnosis(es)/condition(s) as documented were addressed during this visit. Due to the added complexity in care, I will continue to support Yonny in the subsequent management and with ongoing continuity of care.    Subjective   Yonny is a 24 year old, presenting for the following health issues:  RECHECK (Blood pressure)    History of Present Illness       Reason for visit:  Stomach pain gi issues  Symptom onset:  3-7 days ago  Symptom intensity:  Moderate  Symptom progression:  Worsening  Had these symptoms before:  No  What makes it worse:  Na  What makes it better:  Rest   He is taking medications regularly.        He presents to clinic today for follow-up on abdominal pain.  He was seen last week with severe abdominal pain.  Had been on Carafate which was not helping symptoms.  Blood pressure was significantly elevated.  Blood work was completed which was normal.  He was started on omeprazole 40 mg daily, continue with Carafate.  He comes in today stating that Saturday was the last day he needed to take Zofran for nausea.  Sunday his pain was essentially resolved.  Continues on omeprazole and Carafate.  Did have to buy  "omeprazole over-the-counter due to insurance.  He is eating again, is avoiding some things such as spicy foods as they do not even sound good.  Home blood pressure readings have been monitored, 125 234/82-88.  This morning's blood pressure was 131/86.      Objective    /86   Pulse 92   Resp 16   Ht 1.803 m (5' 11\")   Wt 113.9 kg (251 lb)   SpO2 98%   BMI 35.01 kg/m    Body mass index is 35.01 kg/m .  Physical Exam   GENERAL: alert and no distress  EYES: Eyes grossly normal to inspection  RESP: lungs clear to auscultation - no rales, rhonchi or wheezes  CV: regular rate and rhythm, normal S1 S  ABDOMEN: soft, nontender  NEURO: Normal strength and tone, mentation intact and speech normal  PSYCH: mentation appears normal, affect normal/bright            Signed Electronically by: KT Win CNP    "

## 2025-05-14 NOTE — NURSING NOTE
Patient presents today for follow up on blood pressure and abdominal pain.    Medication Reconciliation Complete    Yamilka Tan LPN  5/14/2025 9:41 AM

## 2025-05-29 ENCOUNTER — HOSPITAL ENCOUNTER (OUTPATIENT)
Dept: ULTRASOUND IMAGING | Facility: OTHER | Age: 25
End: 2025-05-29
Attending: NURSE PRACTITIONER
Payer: COMMERCIAL

## 2025-05-29 DIAGNOSIS — R10.13 EPIGASTRIC PAIN: ICD-10-CM

## 2025-05-29 DIAGNOSIS — R11.2 NAUSEA AND VOMITING, UNSPECIFIED VOMITING TYPE: ICD-10-CM

## 2025-05-29 PROCEDURE — 76700 US EXAM ABDOM COMPLETE: CPT

## 2025-05-30 ENCOUNTER — RESULTS FOLLOW-UP (OUTPATIENT)
Dept: FAMILY MEDICINE | Facility: OTHER | Age: 25
End: 2025-05-30

## 2025-07-13 ENCOUNTER — HEALTH MAINTENANCE LETTER (OUTPATIENT)
Age: 25
End: 2025-07-13